# Patient Record
Sex: FEMALE | Race: WHITE | NOT HISPANIC OR LATINO | Employment: FULL TIME | URBAN - METROPOLITAN AREA
[De-identification: names, ages, dates, MRNs, and addresses within clinical notes are randomized per-mention and may not be internally consistent; named-entity substitution may affect disease eponyms.]

---

## 2017-03-07 ENCOUNTER — OFFICE VISIT (OUTPATIENT)
Dept: CARDIOLOGY | Facility: MEDICAL CENTER | Age: 47
End: 2017-03-07
Payer: COMMERCIAL

## 2017-03-07 VITALS
SYSTOLIC BLOOD PRESSURE: 140 MMHG | WEIGHT: 293 LBS | OXYGEN SATURATION: 96 % | HEART RATE: 70 BPM | DIASTOLIC BLOOD PRESSURE: 86 MMHG | BODY MASS INDEX: 47.09 KG/M2 | HEIGHT: 66 IN

## 2017-03-07 DIAGNOSIS — I48.91 ATRIAL FIBRILLATION, UNSPECIFIED TYPE (HCC): ICD-10-CM

## 2017-03-07 DIAGNOSIS — E78.5 DYSLIPIDEMIA: ICD-10-CM

## 2017-03-07 DIAGNOSIS — I10 ESSENTIAL HYPERTENSION: ICD-10-CM

## 2017-03-07 LAB — EKG IMPRESSION: NORMAL

## 2017-03-07 PROCEDURE — 99244 OFF/OP CNSLTJ NEW/EST MOD 40: CPT | Performed by: INTERNAL MEDICINE

## 2017-03-07 PROCEDURE — 93000 ELECTROCARDIOGRAM COMPLETE: CPT | Performed by: INTERNAL MEDICINE

## 2017-03-07 RX ORDER — HYDROXYCHLOROQUINE SULFATE 200 MG/1
200 TABLET, FILM COATED ORAL 2 TIMES DAILY
COMMUNITY
End: 2018-11-19

## 2017-03-07 RX ORDER — LOSARTAN POTASSIUM 25 MG/1
12.5 TABLET ORAL DAILY
COMMUNITY
End: 2020-02-25

## 2017-03-07 RX ORDER — METOPROLOL TARTRATE 50 MG/1
50 TABLET, FILM COATED ORAL DAILY
COMMUNITY
End: 2017-03-07

## 2017-03-07 RX ORDER — SPIRONOLACTONE 50 MG/1
50 TABLET, FILM COATED ORAL DAILY
COMMUNITY
End: 2018-11-19

## 2017-03-07 RX ORDER — ASPIRIN 325 MG
TABLET, DELAYED RELEASE (ENTERIC COATED) ORAL
COMMUNITY
End: 2020-02-25

## 2017-03-07 ASSESSMENT — ENCOUNTER SYMPTOMS
ABDOMINAL PAIN: 0
DOUBLE VISION: 0
CLAUDICATION: 0
DIZZINESS: 0
CHILLS: 0
MYALGIAS: 0
DEPRESSION: 0
ORTHOPNEA: 0
BLURRED VISION: 0
NERVOUS/ANXIOUS: 1
SHORTNESS OF BREATH: 0
PALPITATIONS: 0
FEVER: 0
PND: 0
LOSS OF CONSCIOUSNESS: 0
HEADACHES: 0

## 2017-03-07 NOTE — PROGRESS NOTES
Subjective:   Kady Eason is a 47 y.o. female with known history of paroxysmal atrial fibrillation, grade II diastolic dysfunction, hypertension, chronic lower extremity edema presenting for evaluation and management of atrial fibrillation.    Patient had one episode of atrial fibrillation in 2016 since then she has been tried on flecainide, propafenone, sotalol all of these medications made her lower extremity edema worse following which all the medications were discontinued. As of now patient is on metoprolol which he takes only 50 mg at nighttime not in daytime due to episodes of hypotension and bradycardia. Denied any breakthrough episodes of atrial fibrillations in the last one year. No complaints of exertional chest pain pressure or tightness. Denied any complaints of dizziness lightheadedness or loss of consciousness. She did have episodes of dizziness only when she was taking metoprolol 50 mg twice a day.    Past Medical History   Diagnosis Date   • Lupus (CMS-[facility])    • Arrhythmia    • Atrial fibrillation (CMS-HCC)      Past Surgical History   Procedure Laterality Date   • Primary c section     • Foot surgery     • Carpal tunnel release       Family History   Problem Relation Age of Onset   • Other Mother      MRSA, Multiple sclerosis   • Other Father      Ileostomy   • Heart Disease Father      CAD     History   Smoking status   • Former Smoker -- 1.50 packs/day for 7 years   • Quit date: 02/07/1989   Smokeless tobacco   • Never Used     Allergies   Allergen Reactions   • Dilaudid [Hydromorphone Hcl] Unspecified     Hallucinate     Outpatient Encounter Prescriptions as of 3/7/2017   Medication Sig Dispense Refill   • spironolactone (ALDACTONE) 50 MG Tab Take 50 mg by mouth every day.     • losartan (COZAAR) 25 MG Tab Take 12.5 mg by mouth every day.     • hydroxychloroquine (PLAQUENIL) 200 MG Tab Take 200 mg by mouth 2 times a day.     • Aspirin 325 MG Tablet Delayed Response Take  by mouth.     •  "Cholecalciferol (VITAMIN D3 PO) Take 6,000 Units by mouth.     • Cyanocobalamin (VITAMIN B-12 PO) Take 6,000 mg by mouth.     • metoprolol (LOPRESSOR) 25 MG Tab Take 1 Tab by mouth as needed. 30 Tab 3   • [DISCONTINUED] metoprolol (LOPRESSOR) 50 MG Tab Take 50 mg by mouth every day.       No facility-administered encounter medications on file as of 3/7/2017.     Review of Systems   Constitutional: Negative for fever and chills.   Eyes: Negative for blurred vision and double vision.   Respiratory: Negative for shortness of breath.    Cardiovascular: Negative for chest pain, palpitations, orthopnea, claudication, leg swelling and PND.   Gastrointestinal: Negative for abdominal pain.   Genitourinary: Negative for dysuria and hematuria.   Musculoskeletal: Negative for myalgias and joint pain.   Skin: Negative for rash.   Neurological: Negative for dizziness, loss of consciousness and headaches.   Psychiatric/Behavioral: Negative for depression. The patient is nervous/anxious.         Objective:   /86 mmHg  Pulse 70  Ht 1.676 m (5' 6\")  Wt 143.337 kg (316 lb)  BMI 51.03 kg/m2  SpO2 96%    Physical Exam   Constitutional: She is oriented to person, place, and time. She appears well-developed and well-nourished. No distress.   HENT:   Head: Normocephalic and atraumatic.   Mouth/Throat: No oropharyngeal exudate.   Eyes: Conjunctivae are normal. Pupils are equal, round, and reactive to light. Right eye exhibits no discharge. Left eye exhibits no discharge.   Neck: No JVD present. No tracheal deviation present.   Cardiovascular: Normal rate and regular rhythm.    No murmur heard.  Pulmonary/Chest: Effort normal and breath sounds normal. No respiratory distress. She has no wheezes. She has no rales.   Abdominal: Soft. Bowel sounds are normal. She exhibits no distension. There is no tenderness. There is no rebound.   Musculoskeletal: Normal range of motion. She exhibits no edema.   Neurological: She is alert and " oriented to person, place, and time. No cranial nerve deficit.   Skin: Skin is warm and dry. She is not diaphoretic. No erythema.   Psychiatric: She has a normal mood and affect.        EKG 2/9/17  EKG personally reviewed by me.  Sinus bradycardia 55 bpm, inferior Q waves no significant ST or T-wave changes seen.    Transthoracic echo: 11/8/16  Normal left ventricular chamber size with mild LVH. LVEF 51% grade 2 diastolic dysfunction.  No significant valvular abnormalities.    Labs: 8/25/16  Glucose 86, BUN 20, creatinine 1.07, sodium 139, potassium 4.3, chloride 100, bicarbonate 23  Alkaline phosphatase 50, AST 28, ALT 41  Assessment:     1. Atrial fibrillation, unspecified type (CMS-HCC)  EKG    OVERNIGHT PULSE OXIMETRY    metoprolol (LOPRESSOR) 25 MG Tab   2. Essential hypertension     3. Dyslipidemia  COMP METABOLIC PANEL    LIPID PROFILE       Medical Decision Making:  Today's Assessment / Status / Plan:     1. Patient currently in sinus rhythm.  Okay to take metoprolol 25 mg on PRN basis if she goes into atrial fibrillation.  Calculated [id number] Score for Atrial Fibrillation Stroke Risk is 2.2% per year if not on anticoagulation offered anticoagulation with NOAC patient wants to hold off for now.  Interim continue aspirin 325 mg daily.  Samples for Eliquis 5 mg BID was given. Advised patient to contact our office if she goes into A. fib or if heart rate persistently more than 120 will schedule her for an EKG and if patient is in A. fib will advise her to take Eliquis.  For now will hold off on initiating any antiarrhythmics. She is unable to tolerate flecainide, propafenone or sotalol. Only other option is going to be Tikosyn or A. fib ablation. If patient has further A. fib episodes at that point of time will refer her to see EP.  2. At home blood pressures are well controlled.  Continue losartan 12.5 mg daily.  Continue spironolactone 50 mg daily.  3. Check labs prior to next visit.    Follow up in 6  months. Labs prior to next visit.  Overnight pulse oximetry. If patient has significant hypoxemia will refer her to see pulmonary for official sleep study.    Thank you for allowing me to participate in taking care of patient.    Liza Bautista MD.

## 2017-03-07 NOTE — Clinical Note
[facility]   [address]  [place] [address]  Phone: [phone]  Fax: [phone]              Kady Eason  1970    Encounter Date: 3/7/2017    Liza Trent M.D.          PROGRESS NOTE:  Subjective:   Kady Eason is a 47 y.o. female with known history of paroxysmal atrial fibrillation, grade II diastolic dysfunction, hypertension, chronic lower extremity edema presenting for evaluation and management of atrial fibrillation.    Patient had one episode of atrial fibrillation in 2016 since then she has been tried on flecainide, propafenone, sotalol all of these medications made her lower extremity edema worse following which all the medications were discontinued. As of now patient is on metoprolol which he takes only 50 mg at nighttime not in daytime due to episodes of hypotension and bradycardia. Denied any breakthrough episodes of atrial fibrillations in the last one year. No complaints of exertional chest pain pressure or tightness. Denied any complaints of dizziness lightheadedness or loss of consciousness. She did have episodes of dizziness only when she was taking metoprolol 50 mg twice a day.    Past Medical History   Diagnosis Date   • Lupus (CMS-HCC)    • Arrhythmia    • Atrial fibrillation (CMS-[facility])      Past Surgical History   Procedure Laterality Date   • Primary c section     • Foot surgery     • Carpal tunnel release       Family History   Problem Relation Age of Onset   • Other Mother      MRSA, Multiple sclerosis   • Other Father      Ileostomy   • Heart Disease Father      CAD     History   Smoking status   • Former Smoker -- 1.50 packs/day for 7 years   • Quit date: 02/07/1989   Smokeless tobacco   • Never Used     Allergies   Allergen Reactions   • Dilaudid [Hydromorphone Hcl] Unspecified     Hallucinate     Outpatient Encounter Prescriptions as of 3/7/2017   Medication Sig Dispense Refill   • spironolactone  "(ALDACTONE) 50 MG Tab Take 50 mg by mouth every day.     • losartan (COZAAR) 25 MG Tab Take 12.5 mg by mouth every day.     • hydroxychloroquine (PLAQUENIL) 200 MG Tab Take 200 mg by mouth 2 times a day.     • Aspirin 325 MG Tablet Delayed Response Take  by mouth.     • Cholecalciferol (VITAMIN D3 PO) Take 6,000 Units by mouth.     • Cyanocobalamin (VITAMIN B-12 PO) Take 6,000 mg by mouth.     • metoprolol (LOPRESSOR) 25 MG Tab Take 1 Tab by mouth as needed. 30 Tab 3   • [DISCONTINUED] metoprolol (LOPRESSOR) 50 MG Tab Take 50 mg by mouth every day.       No facility-administered encounter medications on file as of 3/7/2017.     Review of Systems   Constitutional: Negative for fever and chills.   Eyes: Negative for blurred vision and double vision.   Respiratory: Negative for shortness of breath.    Cardiovascular: Negative for chest pain, palpitations, orthopnea, claudication, leg swelling and PND.   Gastrointestinal: Negative for abdominal pain.   Genitourinary: Negative for dysuria and hematuria.   Musculoskeletal: Negative for myalgias and joint pain.   Skin: Negative for rash.   Neurological: Negative for dizziness, loss of consciousness and headaches.   Psychiatric/Behavioral: Negative for depression. The patient is nervous/anxious.         Objective:   /86 mmHg  Pulse 70  Ht 1.676 m (5' 6\")  Wt 143.337 kg (316 lb)  BMI 51.03 kg/m2  SpO2 96%    Physical Exam   Constitutional: She is oriented to person, place, and time. She appears well-developed and well-nourished. No distress.   HENT:   Head: Normocephalic and atraumatic.   Mouth/Throat: No oropharyngeal exudate.   Eyes: Conjunctivae are normal. Pupils are equal, round, and reactive to light. Right eye exhibits no discharge. Left eye exhibits no discharge.   Neck: No JVD present. No tracheal deviation present.   Cardiovascular: Normal rate and regular rhythm.    No murmur heard.  Pulmonary/Chest: Effort normal and breath sounds normal. No " respiratory distress. She has no wheezes. She has no rales.   Abdominal: Soft. Bowel sounds are normal. She exhibits no distension. There is no tenderness. There is no rebound.   Musculoskeletal: Normal range of motion. She exhibits no edema.   Neurological: She is alert and oriented to person, place, and time. No cranial nerve deficit.   Skin: Skin is warm and dry. She is not diaphoretic. No erythema.   Psychiatric: She has a normal mood and affect.        EKG 2/9/17  EKG personally reviewed by me.  Sinus bradycardia 55 bpm, inferior Q waves no significant ST or T-wave changes seen.    Transthoracic echo: 11/8/16  Normal left ventricular chamber size with mild LVH. LVEF 51% grade 2 diastolic dysfunction.  No significant valvular abnormalities.    Labs: 8/25/16  Glucose 86, BUN 20, creatinine 1.07, sodium 139, potassium 4.3, chloride 100, bicarbonate 23  Alkaline phosphatase 50, AST 28, ALT 41  Assessment:     1. Atrial fibrillation, unspecified type (CMS-[facility])  EKG    OVERNIGHT PULSE OXIMETRY    metoprolol (LOPRESSOR) 25 MG Tab   2. Essential hypertension     3. Dyslipidemia  COMP METABOLIC PANEL    LIPID PROFILE       Medical Decision Making:  Today's Assessment / Status / Plan:     1. Patient currently in sinus rhythm.  Okay to take metoprolol 25 mg on PRN basis if she goes into atrial fibrillation.  Calculated [id number] Score for Atrial Fibrillation Stroke Risk is 2.2% per year if not on anticoagulation offered anticoagulation with NOAC patient wants to hold off for now.  Interim continue aspirin 325 mg daily.  Samples for Eliquis 5 mg BID was given. Advised patient to contact our office if she goes into A. fib or if heart rate persistently more than 120 will schedule her for an EKG and if patient is in A. fib will advise her to take Eliquis.  For now will hold off on initiating any antiarrhythmics. She is unable to tolerate flecainide, propafenone or sotalol. Only other option is going to be Tikosyn or A. fib  ablation. If patient has further A. fib episodes at that point of time will refer her to see EP.  2. At home blood pressures are well controlled.  Continue losartan 12.5 mg daily.  Continue spironolactone 50 mg daily.  3. Check labs prior to next visit.    Follow up in 6 months. Labs prior to next visit.  Overnight pulse oximetry. If patient has significant hypoxemia will refer her to see pulmonary for official sleep study.    Thank you for allowing me to participate in taking care of patient.    Liza Bautista MD.          Batool Cristobal M.D.  [address] [place] [address]  VIA Facsimile: [phone]

## 2017-03-07 NOTE — MR AVS SNAPSHOT
"        Kady Albitre   3/7/2017 1:15 PM   Office Visit   MRN: [id number]    Department:  [facility]   Dept Phone:  [phone]    Description:  Female : 1970   Provider:  Liza Trent M.D.           Reason for Visit     New Patient           Allergies as of 3/7/2017     Allergen Noted Reactions    Dilaudid [Hydromorphone Hcl] 2017   Unspecified    Hallucinate      You were diagnosed with     Atrial fibrillation, unspecified type (CMS-[facility])   [5066811]       Essential hypertension   [1412537]       Dyslipidemia   [577652]         Vital Signs     Blood Pressure Pulse Height Weight Body Mass Index Oxygen Saturation    140/86 mmHg 70 1.676 m (5' 6\") 143.337 kg (316 lb) 51.03 kg/m2 96%    Smoking Status                   Former Smoker           Basic Information     Date Of Birth Sex Race Ethnicity Preferred Language    1970 Female White Non- English      Health Maintenance     Patient has no pending health maintenance at this time      Results       Current Immunizations     No immunizations on file.      Below and/or attached are the medications your provider expects you to take. Review all of your home medications and newly ordered medications with your provider and/or pharmacist. Follow medication instructions as directed by your provider and/or pharmacist. Please keep your medication list with you and share with your provider. Update the information when medications are discontinued, doses are changed, or new medications (including over-the-counter products) are added; and carry medication information at all times in the event of emergency situations     Allergies:  DILAUDID - Unspecified               Medications  Valid as of: 2017 -  1:38 PM    Generic Name Brand Name Tablet Size Instructions for use    Aspirin (Tablet Delayed Response) Aspirin 325 MG Take  by mouth.        Cholecalciferol   Take 6,000 Units by mouth.        Cyanocobalamin   Take 6,000 mg by mouth.   "     Hydroxychloroquine Sulfate (Tab) PLAQUENIL 200 MG Take 200 mg by mouth 2 times a day.        Losartan Potassium (Tab) COZAAR 25 MG Take 12.5 mg by mouth every day.        Metoprolol Tartrate (Tab) LOPRESSOR 25 MG Take 1 Tab by mouth as needed.        Spironolactone (Tab) ALDACTONE 50 MG Take 50 mg by mouth every day.        .                 Medicines prescribed today were sent to:     [facility] - [place], [place] - [address]    [address] [place] [address]    Phone: [phone] Fax: [phone]    Open 24 Hours?: No      Medication refill instructions:       If your prescription bottle indicates you have medication refills left, it is not necessary to call your provider’s office. Please contact your pharmacy and they will refill your medication.    If your prescription bottle indicates you do not have any refills left, you may request refills at any time through one of the following ways: The online [email or web address] system (except Urgent Care), by calling your provider’s office, or by asking your pharmacy to contact your provider’s office with a refill request. Medication refills are processed only during regular business hours and may not be available until the next business day. Your provider may request additional information or to have a follow-up visit with you prior to refilling your medication.   *Please Note: Medication refills are assigned a new Rx number when refilled electronically. Your pharmacy may indicate that no refills were authorized even though a new prescription for the same medication is available at the pharmacy. Please request the medicine by name with the pharmacy before contacting your provider for a refill.        Your To Do List     Future Labs/Procedures Complete By Expires    COMP METABOLIC PANEL  As directed 3/7/2018    LIPID PROFILE  As directed 3/7/2018         [email or web address] Access Code: Activation code not generated  Current [email or web address] Status: Active

## 2017-03-10 LAB
ALBUMIN SERPL-MCNC: 3.8 G/DL (ref 3.5–5.5)
ALBUMIN/GLOB SERPL: 1.5 {RATIO} (ref 1.1–2.5)
ALP SERPL-CCNC: 53 IU/L (ref 39–117)
ALT SERPL-CCNC: 25 IU/L (ref 0–32)
AST SERPL-CCNC: 19 IU/L (ref 0–40)
BILIRUB SERPL-MCNC: 0.5 MG/DL (ref 0–1.2)
BUN SERPL-MCNC: 18 MG/DL (ref 6–24)
BUN/CREAT SERPL: 26 (ref 9–23)
CALCIUM SERPL-MCNC: 8.7 MG/DL (ref 8.7–10.2)
CHLORIDE SERPL-SCNC: 103 MMOL/L (ref 96–106)
CHOLEST SERPL-MCNC: 192 MG/DL (ref 100–199)
CO2 SERPL-SCNC: 24 MMOL/L (ref 18–29)
COMMENT 011824: ABNORMAL
CREAT SERPL-MCNC: 0.69 MG/DL (ref 0.57–1)
GLOBULIN SER CALC-MCNC: 2.6 G/DL (ref 1.5–4.5)
GLUCOSE SERPL-MCNC: 86 MG/DL (ref 65–99)
HDLC SERPL-MCNC: 60 MG/DL
LDLC SERPL CALC-MCNC: 117 MG/DL (ref 0–99)
POTASSIUM SERPL-SCNC: 4.3 MMOL/L (ref 3.5–5.2)
PROT SERPL-MCNC: 6.4 G/DL (ref 6–8.5)
SODIUM SERPL-SCNC: 141 MMOL/L (ref 134–144)
TRIGL SERPL-MCNC: 74 MG/DL (ref 0–149)
VLDLC SERPL CALC-MCNC: 15 MG/DL (ref 5–40)

## 2017-03-15 ENCOUNTER — TELEPHONE (OUTPATIENT)
Dept: CARDIOLOGY | Facility: MEDICAL CENTER | Age: 47
End: 2017-03-15

## 2017-03-15 NOTE — TELEPHONE ENCOUNTER
OPO order faxed to [facility].  3/28/17-Per [facility], patient declined OPO, see [facility] correspondence scanned in media. Task sent to nurse.

## 2017-04-04 ENCOUNTER — TELEPHONE (OUTPATIENT)
Dept: CARDIOLOGY | Facility: MEDICAL CENTER | Age: 47
End: 2017-04-04

## 2017-04-04 NOTE — TELEPHONE ENCOUNTER
L/M for patient to return my call to discuss why she refused the OPO exam - will await return call

## 2017-10-03 ENCOUNTER — OFFICE VISIT (OUTPATIENT)
Dept: CARDIOLOGY | Facility: MEDICAL CENTER | Age: 47
End: 2017-10-03
Payer: COMMERCIAL

## 2017-10-03 VITALS
SYSTOLIC BLOOD PRESSURE: 120 MMHG | HEART RATE: 76 BPM | BODY MASS INDEX: 47.09 KG/M2 | HEIGHT: 66 IN | OXYGEN SATURATION: 98 % | DIASTOLIC BLOOD PRESSURE: 84 MMHG | WEIGHT: 293 LBS

## 2017-10-03 DIAGNOSIS — I10 ESSENTIAL HYPERTENSION: ICD-10-CM

## 2017-10-03 DIAGNOSIS — I48.0 PAROXYSMAL A-FIB (HCC): ICD-10-CM

## 2017-10-03 DIAGNOSIS — R00.2 PALPITATIONS: ICD-10-CM

## 2017-10-03 PROCEDURE — 99214 OFFICE O/P EST MOD 30 MIN: CPT | Performed by: INTERNAL MEDICINE

## 2017-10-03 ASSESSMENT — ENCOUNTER SYMPTOMS
ABDOMINAL PAIN: 0
PALPITATIONS: 0
BLURRED VISION: 0
CHILLS: 0
HEADACHES: 0
LOSS OF CONSCIOUSNESS: 0
MYALGIAS: 0
NERVOUS/ANXIOUS: 1
PND: 0
SHORTNESS OF BREATH: 0
ORTHOPNEA: 0
CLAUDICATION: 0
DEPRESSION: 0
DIZZINESS: 0
DOUBLE VISION: 0
FEVER: 0

## 2017-10-03 NOTE — LETTER
[facility]   [address]   [address]  [place] [address]  Phone: [phone]  Fax: [phone]              Kady Eason  1970    Encounter Date: 10/3/2017    Liza Trent M.D.          PROGRESS NOTE:  Subjective:   Kady Eason is a 47 y.o. female with known history of paroxysmal atrial fibrillation, grade II diastolic dysfunction, hypertension, chronic lower extremity edema presenting for evaluation and management of atrial fibrillation.    Patient denied any breakthrough episodes of atrial fibrillation. No further episodes of lower extremity edema since spironolactone was started. Denied any complaints of exertional chest pain pressure or tightness. No complaints of palpitations orthopnea or PND. Denied any complaints of lower extremity edema claudication.    Past Medical History:   Diagnosis Date   • Arrhythmia    • Atrial fibrillation (CMS-HCC)    • Lupus      Past Surgical History:   Procedure Laterality Date   • CARPAL TUNNEL RELEASE     • FOOT SURGERY     • PRIMARY C SECTION       Family History   Problem Relation Age of Onset   • Other Mother      MRSA, Multiple sclerosis   • Other Father      Ileostomy   • Heart Disease Father      CAD     History   Smoking Status   • Former Smoker   • Packs/day: 1.50   • Years: 7.00   • Quit date: 2/7/1989   Smokeless Tobacco   • Never Used     Allergies   Allergen Reactions   • Dilaudid [Hydromorphone Hcl] Unspecified     Hallucinate     Outpatient Encounter Prescriptions as of 10/3/2017   Medication Sig Dispense Refill   • diltiazem (CARDIZEM) 30 MG Tab Take 1 Tab by mouth every 6 hours as needed (tachycardia HR more than 120 bpm). 30 Tab 4   • spironolactone (ALDACTONE) 50 MG Tab Take 50 mg by mouth every day.     • hydroxychloroquine (PLAQUENIL) 200 MG Tab Take 200 mg by mouth 2 times a day.     • Aspirin 325 MG Tablet Delayed Response Take  by mouth.     • Cholecalciferol (VITAMIN  "D3 PO) Take 6,000 Units by mouth.     • losartan (COZAAR) 25 MG Tab Take 12.5 mg by mouth every day.     • Cyanocobalamin (VITAMIN B-12 PO) Take 6,000 mg by mouth.     • metoprolol (LOPRESSOR) 25 MG Tab Take 1 Tab by mouth as needed. 30 Tab 3     No facility-administered encounter medications on file as of 10/3/2017.      Review of Systems   Constitutional: Negative for chills and fever.   Eyes: Negative for blurred vision and double vision.   Respiratory: Negative for shortness of breath.    Cardiovascular: Negative for chest pain, palpitations, orthopnea, claudication, leg swelling and PND.   Gastrointestinal: Negative for abdominal pain.   Genitourinary: Negative for dysuria and hematuria.   Musculoskeletal: Negative for joint pain and myalgias.   Skin: Negative for rash.   Neurological: Negative for dizziness, loss of consciousness and headaches.   Psychiatric/Behavioral: Negative for depression. The patient is nervous/anxious.         Objective:   /84   Pulse 76   Ht 1.676 m (5' 6\")   Wt (!) 142.9 kg (315 lb)   SpO2 98%   BMI 50.84 kg/m²      Physical Exam   Constitutional: She is oriented to person, place, and time. She appears well-developed and well-nourished. No distress.   HENT:   Head: Normocephalic and atraumatic.   Mouth/Throat: No oropharyngeal exudate.   Eyes: Conjunctivae are normal. Pupils are equal, round, and reactive to light. Right eye exhibits no discharge. Left eye exhibits no discharge.   Neck: No JVD present. No tracheal deviation present.   Cardiovascular: Normal rate and regular rhythm.    No murmur heard.  Pulmonary/Chest: Effort normal and breath sounds normal. No respiratory distress. She has no wheezes. She has no rales.   Abdominal: Soft. Bowel sounds are normal. She exhibits no distension. There is no tenderness. There is no rebound.   Musculoskeletal: Normal range of motion. She exhibits no edema.   Neurological: She is alert and oriented to person, place, and time. No " cranial nerve deficit.   Skin: Skin is warm and dry. She is not diaphoretic. No erythema.   Psychiatric: She has a normal mood and affect.      Results for JESÚS MANRIQUEZ (MRN [id number]) as of 10/3/2017 13:02   3/9/2017    Sodium 141   Potassium 4.3   Chloride 103   Co2 24   Glucose 86   Bun 18   Creatinine 0.69   GFR If Non African American 103   Bun-Creatinine Ratio 26 (H)   Calcium 8.7   AST(SGOT) 19   ALT(SGPT) 25   Alkaline Phosphatase 53   Cholesterol,Tot 192   Triglycerides 74   HDL 60    (H)   VLDL Cholesterol Calc 15     EKG 2/9/17  EKG personally reviewed by me.  Sinus bradycardia 55 bpm, inferior Q waves no significant ST or T-wave changes seen.    Transthoracic echo: 11/8/16  Normal left ventricular chamber size with mild LVH. LVEF 51% grade 2 diastolic dysfunction.  No significant valvular abnormalities.    Labs: 8/25/16  Glucose 86, BUN 20, creatinine 1.07, sodium 139, potassium 4.3, chloride 100, bicarbonate 23  Alkaline phosphatase 50, AST 28, ALT 41  Assessment:     1. Paroxysmal a-fib (CMS-[facility])     2. Essential hypertension  COMP METABOLIC PANEL    LIPID PROFILE   3. Palpitations     Dyslipidemia.    Medical Decision Making:  Today's Assessment / Status / Plan:     1. Patient currently in sinus rhythm.  Okay to take Cardizem on when necessary basis for palpitations.  Calculated [id number] Score for Atrial Fibrillation Stroke Risk is 2.2% per year if not on anticoagulation offered anticoagulation with NOAC patient wants to hold off for now.  Interim continue aspirin 325 mg daily.  Last visit samples of Eliquis 5 mg BID were given to patient and she was advised to contact our office if she goes into A. fib or if heart rate persistently more than 120 will schedule her for an EKG and if patient is in A. fib will advise her to take Eliquis.  Patient is unable to tolerate flecainide, propafenone or sotalol. Only other option is going to be Tikosyn or A. fib ablation. If patient has further A. fib  episodes at that point of time will refer her to see EP.  2. At home blood pressures are well controlled.  Continue spironolactone 50 mg daily.  3. Dietary modification and exercise    Follow up in one year.  Labs prior to next visit    Thank you for allowing me to participate in taking care of patient.    Liza Trent. MD.      Batool Cristobal M.D.  [address] [place] [address]  VIA Facsimile: [phone]

## 2017-10-03 NOTE — PROGRESS NOTES
Subjective:   Kady Eason is a 47 y.o. female with known history of paroxysmal atrial fibrillation, grade II diastolic dysfunction, hypertension, chronic lower extremity edema presenting for evaluation and management of atrial fibrillation.    Patient denied any breakthrough episodes of atrial fibrillation. No further episodes of lower extremity edema since spironolactone was started. Denied any complaints of exertional chest pain pressure or tightness. No complaints of palpitations orthopnea or PND. Denied any complaints of lower extremity edema claudication.    Past Medical History:   Diagnosis Date   • Arrhythmia    • Atrial fibrillation (CMS-HCC)    • Lupus      Past Surgical History:   Procedure Laterality Date   • CARPAL TUNNEL RELEASE     • FOOT SURGERY     • PRIMARY C SECTION       Family History   Problem Relation Age of Onset   • Other Mother      MRSA, Multiple sclerosis   • Other Father      Ileostomy   • Heart Disease Father      CAD     History   Smoking Status   • Former Smoker   • Packs/day: 1.50   • Years: 7.00   • Quit date: 2/7/1989   Smokeless Tobacco   • Never Used     Allergies   Allergen Reactions   • Dilaudid [Hydromorphone Hcl] Unspecified     Hallucinate     Outpatient Encounter Prescriptions as of 10/3/2017   Medication Sig Dispense Refill   • diltiazem (CARDIZEM) 30 MG Tab Take 1 Tab by mouth every 6 hours as needed (tachycardia HR more than 120 bpm). 30 Tab 4   • spironolactone (ALDACTONE) 50 MG Tab Take 50 mg by mouth every day.     • hydroxychloroquine (PLAQUENIL) 200 MG Tab Take 200 mg by mouth 2 times a day.     • Aspirin 325 MG Tablet Delayed Response Take  by mouth.     • Cholecalciferol (VITAMIN D3 PO) Take 6,000 Units by mouth.     • losartan (COZAAR) 25 MG Tab Take 12.5 mg by mouth every day.     • Cyanocobalamin (VITAMIN B-12 PO) Take 6,000 mg by mouth.     • metoprolol (LOPRESSOR) 25 MG Tab Take 1 Tab by mouth as needed. 30 Tab 3     No facility-administered encounter  "medications on file as of 10/3/2017.      Review of Systems   Constitutional: Negative for chills and fever.   Eyes: Negative for blurred vision and double vision.   Respiratory: Negative for shortness of breath.    Cardiovascular: Negative for chest pain, palpitations, orthopnea, claudication, leg swelling and PND.   Gastrointestinal: Negative for abdominal pain.   Genitourinary: Negative for dysuria and hematuria.   Musculoskeletal: Negative for joint pain and myalgias.   Skin: Negative for rash.   Neurological: Negative for dizziness, loss of consciousness and headaches.   Psychiatric/Behavioral: Negative for depression. The patient is nervous/anxious.         Objective:   /84   Pulse 76   Ht 1.676 m (5' 6\")   Wt (!) 142.9 kg (315 lb)   SpO2 98%   BMI 50.84 kg/m²     Physical Exam   Constitutional: She is oriented to person, place, and time. She appears well-developed and well-nourished. No distress.   HENT:   Head: Normocephalic and atraumatic.   Mouth/Throat: No oropharyngeal exudate.   Eyes: Conjunctivae are normal. Pupils are equal, round, and reactive to light. Right eye exhibits no discharge. Left eye exhibits no discharge.   Neck: No JVD present. No tracheal deviation present.   Cardiovascular: Normal rate and regular rhythm.    No murmur heard.  Pulmonary/Chest: Effort normal and breath sounds normal. No respiratory distress. She has no wheezes. She has no rales.   Abdominal: Soft. Bowel sounds are normal. She exhibits no distension. There is no tenderness. There is no rebound.   Musculoskeletal: Normal range of motion. She exhibits no edema.   Neurological: She is alert and oriented to person, place, and time. No cranial nerve deficit.   Skin: Skin is warm and dry. She is not diaphoretic. No erythema.   Psychiatric: She has a normal mood and affect.      Results for ALBITRE, MEGGIN (MRN [id number]) as of 10/3/2017 13:02   3/9/2017    Sodium 141   Potassium 4.3   Chloride 103   Co2 24   Glucose 86 "   Bun 18   Creatinine 0.69   GFR If Non African American 103   Bun-Creatinine Ratio 26 (H)   Calcium 8.7   AST(SGOT) 19   ALT(SGPT) 25   Alkaline Phosphatase 53   Cholesterol,Tot 192   Triglycerides 74   HDL 60    (H)   VLDL Cholesterol Calc 15     EKG 2/9/17  EKG personally reviewed by me.  Sinus bradycardia 55 bpm, inferior Q waves no significant ST or T-wave changes seen.    Transthoracic echo: 11/8/16  Normal left ventricular chamber size with mild LVH. LVEF 51% grade 2 diastolic dysfunction.  No significant valvular abnormalities.    Labs: 8/25/16  Glucose 86, BUN 20, creatinine 1.07, sodium 139, potassium 4.3, chloride 100, bicarbonate 23  Alkaline phosphatase 50, AST 28, ALT 41  Assessment:     1. Paroxysmal a-fib (CMS-[facility])     2. Essential hypertension  COMP METABOLIC PANEL    LIPID PROFILE   3. Palpitations     Dyslipidemia.    Medical Decision Making:  Today's Assessment / Status / Plan:     1. Patient currently in sinus rhythm.  Okay to take Cardizem on when necessary basis for palpitations.  Calculated [id number] Score for Atrial Fibrillation Stroke Risk is 2.2% per year if not on anticoagulation offered anticoagulation with NOAC patient wants to hold off for now.  Interim continue aspirin 325 mg daily.  Last visit samples of Eliquis 5 mg BID were given to patient and she was advised to contact our office if she goes into A. fib or if heart rate persistently more than 120 will schedule her for an EKG and if patient is in A. fib will advise her to take Eliquis.  Patient is unable to tolerate flecainide, propafenone or sotalol. Only other option is going to be Tikosyn or A. fib ablation. If patient has further A. fib episodes at that point of time will refer her to see EP.  2. At home blood pressures are well controlled.  Continue spironolactone 50 mg daily.  3. Dietary modification and exercise    Follow up in one year.  Labs prior to next visit    Thank you for allowing me to participate in  taking care of patient.    Liza Bautista MD.

## 2018-11-19 ENCOUNTER — OFFICE VISIT (OUTPATIENT)
Dept: CARDIOLOGY | Facility: MEDICAL CENTER | Age: 48
End: 2018-11-19
Payer: COMMERCIAL

## 2018-11-19 VITALS
HEART RATE: 107 BPM | SYSTOLIC BLOOD PRESSURE: 110 MMHG | OXYGEN SATURATION: 96 % | BODY MASS INDEX: 47.09 KG/M2 | DIASTOLIC BLOOD PRESSURE: 60 MMHG | HEIGHT: 66 IN | WEIGHT: 293 LBS

## 2018-11-19 DIAGNOSIS — I10 ESSENTIAL HYPERTENSION: ICD-10-CM

## 2018-11-19 DIAGNOSIS — Z13.220 SCREENING FOR CHOLESTEROL LEVEL: ICD-10-CM

## 2018-11-19 DIAGNOSIS — I48.0 PAROXYSMAL A-FIB (HCC): ICD-10-CM

## 2018-11-19 DIAGNOSIS — Z79.899 ENCOUNTER FOR LONG-TERM (CURRENT) USE OF HIGH-RISK MEDICATION: ICD-10-CM

## 2018-11-19 LAB — EKG IMPRESSION: NORMAL

## 2018-11-19 PROCEDURE — 93000 ELECTROCARDIOGRAM COMPLETE: CPT | Performed by: INTERNAL MEDICINE

## 2018-11-19 PROCEDURE — 99215 OFFICE O/P EST HI 40 MIN: CPT | Performed by: INTERNAL MEDICINE

## 2018-11-19 RX ORDER — BIOTIN 1 MG
TABLET ORAL
COMMUNITY

## 2018-11-19 ASSESSMENT — ENCOUNTER SYMPTOMS
ORTHOPNEA: 0
DEPRESSION: 0
SHORTNESS OF BREATH: 0
PND: 0
ABDOMINAL PAIN: 0
LOSS OF CONSCIOUSNESS: 0
DIZZINESS: 0
FALLS: 0
PALPITATIONS: 1

## 2018-11-19 NOTE — PROGRESS NOTES
Subjective:   Kady Eason is a 48 y.o. female who presents today to follow-up on atrial fibrillation.    Pertinent history:  Paroxysmal atrial fibrillation  Possible hypertension  Systemic lupus erythematosus     In the past 2 weeks, patient reports that she has had very frequent symptoms and as needed diltiazem as needed.  Previously she would only have occasional symptoms.  She does not know any clear triggers for her palpitations.  No associated dizziness.    She is currently on aspirin.  Has never been on any anticoagulation.    Reports that she does not have hypertension.  She is only on losartan for renal protection.    Past Medical History:   Diagnosis Date   • Arrhythmia    • Atrial fibrillation (HCC)    • Lupus      Past Surgical History:   Procedure Laterality Date   • CARPAL TUNNEL RELEASE     • FOOT SURGERY     • PRIMARY C SECTION       Family History   Problem Relation Age of Onset   • Other Mother         MRSA, Multiple sclerosis   • Other Father         Ileostomy   • Heart Disease Father         CAD     Social History     Social History   • Marital status:      Spouse name: N/A   • Number of children: N/A   • Years of education: N/A     Occupational History   • Not on file.     Social History Main Topics   • Smoking status: Former Smoker     Packs/day: 1.50     Years: 7.00     Quit date: 2/7/1989   • Smokeless tobacco: Never Used   • Alcohol use Yes   • Drug use: No   • Sexual activity: Not on file     Other Topics Concern   • Not on file     Social History Narrative   • No narrative on file     Allergies   Allergen Reactions   • Dilaudid [Hydromorphone Hcl] Unspecified     Hallucinate     Outpatient Encounter Prescriptions as of 11/19/2018   Medication Sig Dispense Refill   • methotrexate 2.5 MG Tab 3 tabs a week  5   • Biotin 1000 MCG Tab Take  by mouth.     • losartan (COZAAR) 25 MG Tab Take 12.5 mg by mouth every day.     • Aspirin 325 MG Tablet Delayed Response Take  by mouth.     •  "Cholecalciferol (VITAMIN D3 PO) Take 6,000 Units by mouth.     • diltiazem (CARDIZEM) 30 MG Tab Take 1 Tab by mouth every 6 hours as needed (tachycardia HR more than 120 bpm). 30 Tab 4   • [DISCONTINUED] spironolactone (ALDACTONE) 50 MG Tab Take 50 mg by mouth every day.     • [DISCONTINUED] hydroxychloroquine (PLAQUENIL) 200 MG Tab Take 200 mg by mouth 2 times a day.     • [DISCONTINUED] Cyanocobalamin (VITAMIN B-12 PO) Take 6,000 mg by mouth.     • [DISCONTINUED] metoprolol (LOPRESSOR) 25 MG Tab Take 1 Tab by mouth as needed. 30 Tab 3     No facility-administered encounter medications on file as of 11/19/2018.      Review of Systems   Constitutional: Negative for malaise/fatigue.   Respiratory: Negative for shortness of breath.    Cardiovascular: Positive for palpitations. Negative for chest pain, orthopnea, leg swelling and PND.   Gastrointestinal: Negative for abdominal pain.   Musculoskeletal: Negative for falls.   Neurological: Negative for dizziness and loss of consciousness.   Psychiatric/Behavioral: Negative for depression.   All other systems reviewed and are negative.       Objective:   /60 (BP Location: Right arm, Patient Position: Sitting)   Pulse (!) 107   Ht 1.676 m (5' 6\")   Wt (!) 137 kg (302 lb)   SpO2 96%   BMI 48.74 kg/m²     Physical Exam   Constitutional: She is oriented to person, place, and time. She appears well-developed and well-nourished. No distress.   HENT:   Head: Normocephalic and atraumatic.   Eyes: Conjunctivae are normal.   Neck: Normal range of motion. Neck supple.   Cardiovascular: Normal rate, regular rhythm and normal heart sounds.  Exam reveals no gallop and no friction rub.    No murmur heard.  Pulmonary/Chest: Effort normal and breath sounds normal. No respiratory distress. She has no wheezes. She has no rales.   Abdominal: Soft. She exhibits no distension. There is no tenderness.   Musculoskeletal: She exhibits no edema.   Neurological: She is alert and oriented " to person, place, and time.   Skin: Skin is warm and dry. She is not diaphoretic.   Psychiatric: She has a normal mood and affect. Her behavior is normal.   Nursing note and vitals reviewed.    EKG performed today was personally reviewed and per my interpretation showed sinus tachycardia at 108 bpm.  Q waves in the anteroseptal leads.    Assessment:     1. Essential hypertension  BASIC METABOLIC PANEL    EKG   2. Paroxysmal A-fib (HCC)  BASIC METABOLIC PANEL    EKG   3. Encounter for long-term (current) use of high-risk medication     4. Screening for cholesterol level  Lipid Profile       Medical Decision Making:  Today's Assessment / Status / Plan:     Paroxysmal atrial fibrillation: Patient has been having more frequent symptoms lately.  I discussed starting her on daily diltiazem however patient is hesitant as she reports that she gets bradycardic with this and feels really tired.  She has previously been tried on flecainide, sotalol and propafenone and was unable to tolerate any of them.  Dr. Trent had discussed potentially starting her on Tikosyn or referring her for an ablation.  I discussed these options with the patient in detail but she is  not interested in either of these options at this time.  Her CHADS-Vasc score is at least 2.  Ideally she should be on anticoagulation.  We discussed the risks and benefits of anticoagulation.  Unfortunately the patient is not interested.  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  She worries about bleeding after such trauma.  She understands her risk for stroke but would like to continue on aspirin alone.    Given the above detailed discussion, I would recommend that the patient continues her current diltiazem as needed along with her aspirin.  I discussed various stroke symptoms with the patient in detail and advised her to call 911 should any of them occur.  She also knows that should she have palpitations that fail to improve, she should go to the  nearest ER for evaluation.    Possible hypertension: Blood pressure is at goal.  Continue losartan at current dose.  Chem-7 ordered today to evaluate renal function.    Screening labs for hyperlipidemia ordered.    Total 40 minutes face-to-face time spent with patient, with greater than 50% of the total time discussing patient's issues and symptoms as listed above in assessment and plan, as well as managing coordination of care for future evaluation and treatment. Most of the time was spent discussing management of atrial fibrillation along with anticoagulation as discussed above.     Return to clinic in 1 year or earlier if needed.    Thank you for allowing me to participate in the care of this patient. Please do not hesitate to contact me with any questions.    Alize Stafford MD  Cardiologist  [facility]      PLEASE NOTE: This dictation was created using voice recognition software.

## 2018-11-20 ENCOUNTER — HOSPITAL ENCOUNTER (OUTPATIENT)
Dept: LAB | Facility: MEDICAL CENTER | Age: 48
End: 2018-11-20
Attending: INTERNAL MEDICINE
Payer: COMMERCIAL

## 2018-11-20 DIAGNOSIS — Z13.220 SCREENING FOR CHOLESTEROL LEVEL: ICD-10-CM

## 2018-11-20 DIAGNOSIS — I10 ESSENTIAL HYPERTENSION: ICD-10-CM

## 2018-11-20 DIAGNOSIS — I48.0 PAROXYSMAL A-FIB (HCC): ICD-10-CM

## 2018-11-20 PROCEDURE — 36415 COLL VENOUS BLD VENIPUNCTURE: CPT

## 2018-11-20 PROCEDURE — 80061 LIPID PANEL: CPT

## 2018-11-20 PROCEDURE — 80048 BASIC METABOLIC PNL TOTAL CA: CPT

## 2018-11-21 LAB
ANION GAP SERPL CALC-SCNC: 7 MMOL/L (ref 0–11.9)
BUN SERPL-MCNC: 20 MG/DL (ref 8–22)
CALCIUM SERPL-MCNC: 9 MG/DL (ref 8.5–10.5)
CHLORIDE SERPL-SCNC: 107 MMOL/L (ref 96–112)
CHOLEST SERPL-MCNC: 187 MG/DL (ref 100–199)
CO2 SERPL-SCNC: 27 MMOL/L (ref 20–33)
CREAT SERPL-MCNC: 0.73 MG/DL (ref 0.5–1.4)
FASTING STATUS PATIENT QL REPORTED: NORMAL
GLUCOSE SERPL-MCNC: 107 MG/DL (ref 65–99)
HDLC SERPL-MCNC: 41 MG/DL
LDLC SERPL CALC-MCNC: 130 MG/DL
POTASSIUM SERPL-SCNC: 4.4 MMOL/L (ref 3.6–5.5)
SODIUM SERPL-SCNC: 141 MMOL/L (ref 135–145)
TRIGL SERPL-MCNC: 81 MG/DL (ref 0–149)

## 2018-11-26 ENCOUNTER — TELEPHONE (OUTPATIENT)
Dept: CARDIOLOGY | Facility: MEDICAL CENTER | Age: 48
End: 2018-11-26

## 2018-11-26 NOTE — TELEPHONE ENCOUNTER
----- Message from Alize Stafford M.D. sent at 11/26/2018 12:37 PM PST -----  Labs reviewed.  LDL mildly elevated.  Patient should watch her diet.  No change in management for now.  Thank you  AA

## 2020-02-25 ENCOUNTER — OFFICE VISIT (OUTPATIENT)
Dept: NEPHROLOGY | Facility: MEDICAL CENTER | Age: 50
End: 2020-02-25
Payer: COMMERCIAL

## 2020-02-25 VITALS
HEIGHT: 66 IN | OXYGEN SATURATION: 100 % | BODY MASS INDEX: 44.84 KG/M2 | RESPIRATION RATE: 16 BRPM | SYSTOLIC BLOOD PRESSURE: 126 MMHG | TEMPERATURE: 98.6 F | WEIGHT: 279 LBS | HEART RATE: 78 BPM | DIASTOLIC BLOOD PRESSURE: 78 MMHG

## 2020-02-25 DIAGNOSIS — M32.9 LUPUS (HCC): ICD-10-CM

## 2020-02-25 DIAGNOSIS — I48.0 PAROXYSMAL A-FIB (HCC): ICD-10-CM

## 2020-02-25 DIAGNOSIS — N15.9 KIDNEY INFECTION: ICD-10-CM

## 2020-02-25 DIAGNOSIS — I10 ESSENTIAL HYPERTENSION: ICD-10-CM

## 2020-02-25 PROCEDURE — 99204 OFFICE O/P NEW MOD 45 MIN: CPT | Performed by: INTERNAL MEDICINE

## 2020-02-25 RX ORDER — DEXAMETHASONE 2 MG/1
2 TABLET ORAL 2 TIMES DAILY
COMMUNITY

## 2020-02-25 ASSESSMENT — ENCOUNTER SYMPTOMS
ABDOMINAL PAIN: 0
FEVER: 0
SHORTNESS OF BREATH: 0

## 2020-02-25 NOTE — PROGRESS NOTES
"Chief Complaint   Patient presents with   • New Patient   • Chronic Kidney Disease       Requesting Provider: Dr. Kit Morrissey in [place], [place]    HPI:  Kady Eason is a 50 y.o. female with lupus who presents today to establish nephrology care.     Re: HTN, she was on BP medications for a bit when she had Afib. She has since stopped taking anti-HTN medications.     Re: kidney infections. She says she has had 3 kidney infections in 40 days. She has been on multiple antibiotics. She complained of pain in her back. She is worried the antibiotics aren't doing anything. She denies history of kidney stones. She says she has had average one kidney infection a year for the last six years. She says she had urine tested which were \"positive\" for infection.     Re: lupus, diagnosed 2013 after having butterfly rash, MSK and joint pain. She says she had 8 of the 11 symptoms for lupus, but her DARIN was negative. She was on plaquenil for 4.5 years and then it stopped working. She was then given toradol and solumedrol by her PCP. She gives herself toradol shots first, and then gives herself solumedrol shots if the toradol doesn't stop it. She then takes dexamethasone for five days after solumedrol. She doesn't know if lupus has affected her kidneys. She gives herself a shot of toradol once every three weeks on average.       Past Medical History:   Diagnosis Date   • Arrhythmia    • Atrial fibrillation (HCC)    • Lupus (HCC)        Past Surgical History:   Procedure Laterality Date   • CARPAL TUNNEL RELEASE     • FOOT SURGERY     • PRIMARY C SECTION          Outpatient Encounter Medications as of 2/25/2020   Medication Sig Dispense Refill   • methylPREDNISolone Sodium Succ (SOLU-MEDROL INJ) by Injection route.     • dexamethasone (DECADRON) 2 MG tablet Take 2 mg by mouth 2 times a day.     • Ketorolac Tromethamine (TORADOL INJ) by Injection route.     • Biotin 1000 MCG Tab Take  by mouth.     • Cholecalciferol (VITAMIN D3 PO) Take 6,000 " Units by mouth.     • [DISCONTINUED] methotrexate 2.5 MG Tab 3 tabs a week  5   • [DISCONTINUED] diltiazem (CARDIZEM) 30 MG Tab Take 1 Tab by mouth every 6 hours as needed (tachycardia HR more than 120 bpm). 30 Tab 4   • [DISCONTINUED] losartan (COZAAR) 25 MG Tab Take 12.5 mg by mouth every day.     • [DISCONTINUED] Aspirin 325 MG Tablet Delayed Response Take  by mouth.       No facility-administered encounter medications on file as of 2020.         Allergies   Allergen Reactions   • Ciprofloxacin Hives and Itching   • Dilaudid [Hydromorphone Hcl] Unspecified     Hallucinate       Social History     Socioeconomic History   • Marital status:      Spouse name: Not on file   • Number of children: Not on file   • Years of education: Not on file   • Highest education level: Not on file   Occupational History   • Not on file   Social Needs   • Financial resource strain: Not on file   • Food insecurity     Worry: Not on file     Inability: Not on file   • Transportation needs     Medical: Not on file     Non-medical: Not on file   Tobacco Use   • Smoking status: Former Smoker     Packs/day: 1.50     Years: 7.00     Pack years: 10.50     Last attempt to quit: 1989     Years since quittin.0   • Smokeless tobacco: Never Used   Substance and Sexual Activity   • Alcohol use: Yes     Comment: 2-3 drinks a month   • Drug use: No   • Sexual activity: Not on file   Lifestyle   • Physical activity     Days per week: Not on file     Minutes per session: Not on file   • Stress: Not on file   Relationships   • Social connections     Talks on phone: Not on file     Gets together: Not on file     Attends [employer] service: Not on file     Active member of club or organization: Not on file     Attends meetings of clubs or organizations: Not on file     Relationship status: Not on file   • Intimate partner violence     Fear of current or ex partner: Not on file     Emotionally abused: Not on file     Physically  "abused: Not on file     Forced sexual activity: Not on file   Other Topics Concern   • Not on file   Social History Narrative    Therapist for adjudicated youth       Family History   Problem Relation Age of Onset   • Other Mother         MRSA, Multiple sclerosis   • Other Father         Ileostomy   • Heart Disease Father         CAD   • Arthritis Father         psoriatic arthritis   • Kidney Disease Neg Hx        Review of Systems   Constitutional: Negative for fever.   Respiratory: Negative for shortness of breath.    Cardiovascular: Negative for chest pain.   Gastrointestinal: Negative for abdominal pain.   Genitourinary: Negative for dysuria and hematuria.   All other systems reviewed and are negative.      /78 (BP Location: Right arm, Patient Position: Sitting, BP Cuff Size: Large adult)   Pulse 78   Temp 37 °C (98.6 °F) (Temporal)   Resp 16   Ht 1.676 m (5' 6\")   Wt (!) 126.6 kg (279 lb)   SpO2 100%   BMI 45.03 kg/m²     Physical Exam   Constitutional: She is oriented to person, place, and time and well-developed, well-nourished, and in no distress. No distress.   HENT:   Mouth/Throat: Oropharynx is clear and moist. No oropharyngeal exudate.   Eyes: EOM are normal. No scleral icterus.   Neck: Neck supple. No tracheal deviation present.   Cardiovascular: Normal rate, regular rhythm and normal heart sounds.   No murmur heard.  Pulmonary/Chest: Effort normal and breath sounds normal. No stridor. She has no rales.   Abdominal: Soft. Bowel sounds are normal. There is no abdominal tenderness.   Obese    Musculoskeletal: Normal range of motion.         General: No edema.   Neurological: She is alert and oriented to person, place, and time.   Skin: Skin is warm and dry. No rash noted.   Psychiatric: Mood and affect normal.         Labs reviewed.    No results for input(s): HGB, ALBUMIN, HDL, TRIGLYCERIDE, SODIUM, POTASSIUM, CHLORIDE, CO2, BUN, CREATININE, PHOSPHORUS in the last 8544 hours.    Invalid " input(s): CHOLESTEROL, LDL CLACULATED, URIC ACID, INTACT PTH, PRO CREAT RATIO    No results found for: WBC, RBC, HEMOGLOBIN, HEMATOCRIT, MCV, MCH, MCHC, MPV        URINALYSIS:  No results found for: COLORURINE, CLARITY, SPECGRAVITY, PHURINE, KETONES, PROTEINURIN, BILIRUBINUR, UROBILU, NITRITE, LEUKESTERAS, OCCULTBLOOD  UPC  No results found for: TOTPROTUR No results found for: CREATININEU    Urine culture from 1/9/2020  E. Coli, resistant to ampicillin, sensitive to all others.    Imaging reviewed  No orders to display         Assessment:  Kady Eason is a 50 y.o. female with lupus who presents today to establish nephrology care.     Plan:  1. Essential hypertension  - self resolved.  Patient not currently on medication.  Blood pressure well controlled.    2. Paroxysmal A-fib (HCC)  -Paroxysmal episode that self resolved.  Patient has not had any recurrent episodes of A. fib.  Not currently on anticoagulation or rate control.    3.  History of kidney infections  -Concern for retained calculus.  Patient says she had an ultrasound that did not show stone.  Recommend CT renal colic abdomen pelvis scan to evaluate for stones.  If negative, consider referral to urology to evaluate for reflux.    4.  History of lupus  - Diagnosed based on symptoms in the past.  Patient currently on Toradol for flareups.  I recommend checking baseline labs to evaluate for lupus nephritis, and if positive, consider kidney biopsy.  I recommend limiting or avoiding Toradol, as it can cause kidney problems.        RTC 3 months, possibly sooner if patient needs kidney biopsy.  If patient does return in 3 months, will need orders for repeat labs prior to return visit.    Colby Chiang MD  Nephrology

## 2020-02-28 LAB
ALBUMIN SERPL-MCNC: 4.2 G/DL (ref 3.8–4.9)
ALBUMIN/CREAT UR: 3 MG/G CREAT (ref 0–29)
ANA SER QL: NEGATIVE
APPEARANCE UR: CLEAR
BACTERIA #/AREA URNS HPF: ABNORMAL /[HPF]
BASOPHILS # BLD AUTO: 0.1 X10E3/UL (ref 0–0.2)
BASOPHILS NFR BLD AUTO: 1 %
BILIRUB UR QL STRIP: NEGATIVE
BUN SERPL-MCNC: 17 MG/DL (ref 6–24)
BUN/CREAT SERPL: 27 (ref 9–23)
C3 SERPL-MCNC: 140 MG/DL (ref 82–167)
C4 SERPL-MCNC: 22 MG/DL (ref 14–44)
CALCIUM SERPL-MCNC: 9.3 MG/DL (ref 8.7–10.2)
CASTS URNS MICRO: ABNORMAL
CASTS URNS QL MICRO: ABNORMAL
CHLORIDE SERPL-SCNC: 105 MMOL/L (ref 96–106)
CO2 SERPL-SCNC: 23 MMOL/L (ref 20–29)
COLOR UR: YELLOW
CREAT SERPL-MCNC: 0.62 MG/DL (ref 0.57–1)
CREAT UR-MCNC: 160.1 MG/DL
CRYSTALS URNS MICRO: ABNORMAL
DSDNA AB SER-ACNC: <1 IU/ML (ref 0–9)
EOSINOPHIL # BLD AUTO: 0.1 X10E3/UL (ref 0–0.4)
EOSINOPHIL NFR BLD AUTO: 2 %
EPI CELLS #/AREA URNS HPF: >10 /HPF (ref 0–10)
ERYTHROCYTE [DISTWIDTH] IN BLOOD BY AUTOMATED COUNT: 14.5 % (ref 11.7–15.4)
GLUCOSE SERPL-MCNC: 103 MG/DL (ref 65–99)
GLUCOSE UR QL: NEGATIVE
HCT VFR BLD AUTO: 43.4 % (ref 34–46.6)
HGB BLD-MCNC: 14.5 G/DL (ref 11.1–15.9)
HGB UR QL STRIP: NEGATIVE
IMM GRANULOCYTES # BLD AUTO: 0 X10E3/UL (ref 0–0.1)
IMM GRANULOCYTES NFR BLD AUTO: 0 %
IMMATURE CELLS  115398: NORMAL
INR PPP: 1 (ref 0.8–1.2)
KETONES UR QL STRIP: NEGATIVE
LEUKOCYTE ESTERASE UR QL STRIP: ABNORMAL
LYMPHOCYTES # BLD AUTO: 2.6 X10E3/UL (ref 0.7–3.1)
LYMPHOCYTES NFR BLD AUTO: 38 %
MCH RBC QN AUTO: 29.9 PG (ref 26.6–33)
MCHC RBC AUTO-ENTMCNC: 33.4 G/DL (ref 31.5–35.7)
MCV RBC AUTO: 90 FL (ref 79–97)
MICRO URNS: ABNORMAL
MICRO URNS: ABNORMAL
MICROALBUMIN UR-MCNC: 5 UG/ML
MONOCYTES # BLD AUTO: 0.6 X10E3/UL (ref 0.1–0.9)
MONOCYTES NFR BLD AUTO: 8 %
MORPHOLOGY BLD-IMP: NORMAL
MUCOUS THREADS URNS QL MICRO: PRESENT
NEUTROPHILS # BLD AUTO: 3.6 X10E3/UL (ref 1.4–7)
NEUTROPHILS NFR BLD AUTO: 51 %
NITRITE UR QL STRIP: NEGATIVE
NRBC BLD AUTO-RTO: NORMAL %
PH UR STRIP: 5.5 [PH] (ref 5–7.5)
PLATELET # BLD AUTO: 363 X10E3/UL (ref 150–450)
POTASSIUM SERPL-SCNC: 4.6 MMOL/L (ref 3.5–5.2)
PROT UR QL STRIP: NEGATIVE
PROTHROMBIN TIME: 10.9 SEC (ref 9.1–12)
RBC # BLD AUTO: 4.85 X10E6/UL (ref 3.77–5.28)
RBC #/AREA URNS HPF: ABNORMAL /HPF (ref 0–2)
RENAL EPI CELLS #/AREA URNS HPF: ABNORMAL /[HPF]
SODIUM SERPL-SCNC: 141 MMOL/L (ref 134–144)
SP GR UR: 1.02 (ref 1–1.03)
T VAGINALIS URNS QL MICRO: ABNORMAL
UNIDENT CRYS URNS QL MICRO: PRESENT
URNS CMNT MICRO: ABNORMAL
UROBILINOGEN UR STRIP-MCNC: 0.2 MG/DL (ref 0.2–1)
WBC # BLD AUTO: 7 X10E3/UL (ref 3.4–10.8)
WBC #/AREA URNS HPF: ABNORMAL /HPF (ref 0–5)
YEAST #/AREA URNS HPF: ABNORMAL /[HPF]

## 2020-03-19 DIAGNOSIS — M32.9 SYSTEMIC LUPUS ERYTHEMATOSUS, UNSPECIFIED SLE TYPE, UNSPECIFIED ORGAN INVOLVEMENT STATUS (HCC): ICD-10-CM

## 2020-03-19 NOTE — PROGRESS NOTES
Ms. Cabrera,  Your blood counts showed no significant abnormalities.  Your urine test showed calcium oxalate crystals, but no microscopic blood or protein.  This means you are at risk of calcium oxalate kidney stones in the future, and I recommend drinking at least 2 L of liquid a day to help prevent kidney stones.  Your kidney function tests showed no significant abnormalities.  Your blood test work-up for lupus showed no significant abnormalities, in other words, no evidence of active lupus on your blood tests.  I do not think you need a kidney biopsy, because I do not think lupus is actively affecting your kidneys at this time.  Because your kidney function is normal, I think you can actually see me once a year.  I recommend rescheduling your visit to see me again in February 2021.  Please get blood and urine labs done about 1 week prior to your next visit with me.  I have ordered them in the [facility] system, but if you need paper copies, call my office, and we can fax them to your local laboratory.  Sincerely,  Dr. Chiang (kidney doctor)

## 2020-04-30 ENCOUNTER — HOSPITAL ENCOUNTER (OUTPATIENT)
Dept: RADIOLOGY | Facility: MEDICAL CENTER | Age: 50
End: 2020-04-30
Attending: INTERNAL MEDICINE
Payer: COMMERCIAL

## 2020-04-30 DIAGNOSIS — M32.9 LUPUS (HCC): ICD-10-CM

## 2020-04-30 DIAGNOSIS — N15.9 KIDNEY INFECTION: ICD-10-CM

## 2020-04-30 PROCEDURE — 74176 CT ABD & PELVIS W/O CONTRAST: CPT

## 2020-05-01 DIAGNOSIS — N83.201 CYST OF RIGHT OVARY: ICD-10-CM

## 2020-05-01 NOTE — PROGRESS NOTES
CT scan showed no kidney stones. You do have an ovarian cyst on the right side. I will refer you to a gynecologist if you'd like to discuss that further.   Sincerely,  Dr. Chiang (kidney doctor)

## 2021-03-09 ENCOUNTER — APPOINTMENT (OUTPATIENT)
Dept: NEPHROLOGY | Facility: MEDICAL CENTER | Age: 51
End: 2021-03-09
Payer: COMMERCIAL